# Patient Record
(demographics unavailable — no encounter records)

---

## 2025-03-25 NOTE — DISCUSSION/SUMMARY
[de-identified] : Diagnosis: Meniscus Tear   CAROLINA ALMANZAR 73 year  M was seen and evaluated in the office today. Following evaluation, and history of the patient's condition at length, the pathology was explained in full to the patient in layman's terms. Explained to the patient that based on physical exam and imaging review, they likely have Medial/lateral meniscal tear. Discussed with patient that due to the fragile blood supply of the meniscus and the nature of the condition, it is unlikely that the tear will heal on its own, and they will likely experience constant/episodic pain in the knee limiting ADLs. Discussed with patients that due to this meniscal tear, they are at increased risk of developing knee osteoarthritis and therefore ultimately requiring a total knee arthroplasty in their lifetime.   In the interim, discussed with patient several different treatment options regarding managing the gradual loss of function associated with meniscal tears and the progression to OA along with specific risks and benefits. Nonsurgical options including but not limited to Corticosteroid Injection, Meloxicam 15mg, Medrol Dose Pack, along with activity modification such as non-impact exercise and organized physical therapy. The risk of morbidity associated with proposed treatments were discussed.   Furthermore, discussed with CAROLINA that they could also delay any immediate treatment options and continue to observe and self-care for the discussed problem. Discussed Home Exercise Programs as well as Rest, Ice and elevation. Patient had ample time to ask any questions about todays visit and the diagnosis, and all questions were answered. Patient verbally expressed they understand the discussion today and the plan going forward. -- After discussion of options the patient expressed a desire to hold off on any of the proposed treatments at this time. As, discussed with patient, the plan is for the patient to observe and continue to self-manage, these including but not limited to HEP, Ice, NSAIDs PRN and rest. Patient was instructed to f/u as needed if symptoms persist/worsen. They expressed understanding of this plan.  -- Patient was advised to follow up in 2-3 months to further evaluate, or sooner if symptoms worsen.

## 2025-03-25 NOTE — HISTORY OF PRESENT ILLNESS
[de-identified] : Date of Injury/Onset: RIGHT KNEE Mechanism of injury: NKI Have you been treated for this in the past? NO Have you had surgery for this in the past? NO Physical Therapy/ HEP: None OTC Medicines: NO RX medicines:   Heat, Ice, Elevation: None CSI or Gel Injections:  None Other Previous Treatment: Prior Imaging/Studies:   HAS MRI  At Rest: 0/10 With Activity: 0/10 Quality of symptoms:   Affecting Sleep: Yes Stiffness upon waking, lasting greater than 30mins: Yes Difficulty with stairs: Yes Difficulty getting in and out of car: Yes Sit to stand stiffness: Yes Affects walking short/long distances? Yes Home/Work/Recreation affected? Yes   Improves with: Worse with:   This is not a Work-Related Injury being treated under Worker's Compensation. This is not an athletic injury occurring associated with an interscholastic or organized sports team.

## 2025-03-25 NOTE — DATA REVIEWED
[MRI] : MRI [Right] : of the right [Knee] : knee [I independently reviewed and interpreted images and report] : I independently reviewed and interpreted images and report [FreeTextEntry1] : Longitudinal vertical tear involving the body lateral meniscus. Mild fraying/truncation of the free edge body medial meniscus.

## 2025-03-25 NOTE — PHYSICAL EXAM
[5___] : hamstring 5[unfilled]/5 [] : non-antalgic [Right] : right knee [AP] : anteroposterior [Lateral] : lateral [[place]] : skyline [AP Standing] : anteroposterior standing [FreeTextEntry9] : Mild to moderate degenerative changes with overall maintained joint spaces.

## 2025-05-09 NOTE — CARDIOLOGY SUMMARY
[de-identified] : Atrial fibrillation with a ventricular rate of 64 bpm, a normal axis, a normal QRS duration 97 ms, a normal QT interval 379 ms with delayed R wave transition across the anterior precordium, and inverted T waves noted in leads III and aVF.

## 2025-05-09 NOTE — SURGICAL HISTORY
[TextEntry] : - angiogram right    - drainage and removal of lesion on back    - EYE SURGERY    - FLUORO ASST. ANGIOGRAM Right   leg - HAND SURGERY   1995 - right hand surgery    - TOENAIL EXCISION

## 2025-05-09 NOTE — HISTORY OF PRESENT ILLNESS
[FreeTextEntry1] : As you know Mr. Aung Martinez is a 73-year-old man with a past medical history of chronic atrial fibrillation, PAD, diabetes mellitus, hypertension, mitral valve regurgitation, and dyslipidemia who presents today for cardiovascular follow up. The patient has been feeling well. He is a bit fatigued, however. He is trying to better manage his diabetes. He has not been exercising due to pain in his back and knees. He denies any chest pain, shortness of breath, syncope, orthopnea, or PND. He has been taking his medications as prescribed. He continues on Eliquis and denies any epistaxis, hemoptysis, hematochezia, hematemesis, hematuria, or melena.

## 2025-05-09 NOTE — REVIEW OF SYSTEMS
[Fever] : no fever [Headache] : no headache [Weight Gain (___ Lbs)] : no recent weight gain [Chills] : no chills [Feeling Fatigued] : feeling fatigued [Weight Loss (___ Lbs)] : no recent weight loss [Blurry Vision] : no blurred vision [Seeing Double (Diplopia)] : no diplopia [Eye Pain] : no eye pain [Earache] : no earache [Discharge From Ears] : no discharge from the ears [Hearing Loss] : no hearing loss [Mouth Sores] : no mouth sores [Sore Throat] : no sore throat [Sinus Pressure] : no sinus pressure [Tinnitus] : no tinnitus [Vertigo] : no vertigo [SOB] : no shortness of breath [Dyspnea on exertion] : not dyspnea during exertion [Chest Discomfort] : no chest discomfort [Lower Ext Edema] : no extremity edema [Leg Claudication] : no intermittent leg claudication [Palpitations] : no palpitations [Orthopnea] : no orthopnea [PND] : no PND [Syncope] : no syncope [Cough] : no cough [Wheezing] : no wheezing [Coughing Up Blood] : no hemoptysis [Snoring] : no snoring [Abdominal Pain] : no abdominal pain [Nausea] : no nausea [Vomiting] : no vomiting [Heartburn] : no heartburn [Change in Appetite] : no change in appetite [Change In The Stool] : no change in stool [Dysphagia] : no dysphagia [Diarrhea] : diarrhea [Constipation] : no constipation [Blood in stool] : no blood in stoo [Urinary Frequency] : no change in urinary frequency [Hematuria] : no hematuria [Pain During Urination] : no dysuria [Erectile Dysfunction] : no erectile dysfunction [Nocturia] : no nocturia [Joint Pain] : joint pain [Joint Swelling] : no joint swelling [Joint Stiffness] : no joint stiffness [Muscle Cramps] : no muscle cramps [Myalgia] : no myalgia [Knee Pain] : knee pain [Lower Back Pain] : lower back pain [Mid Back Pain] : mid back pain [Upper Back Pain] : upper back pain [Rash] : no rash [Itching] : no itching [Change In Color Of Skin] : change in skin color [Skin Lesions] : no skin lesions [Telangiectasias] : no telangiectasias [Dizziness] : no dizziness [Tremor] : no tremor was seen [Numbness (Hypoesthesia)] : no numbness [Convulsions] : no convulsions [Tingling (Paresthesia)] : no tingling [Limb Weakness (Paresis)] : no limb weakness (Paresis) [Speech Disturbance] : no speech disturbance [Confusion] : no confusion was observed [Memory Lapses Or Loss] : no memory lapses or loss [Depression] : no depression [Anxiety] : no anxiety [Under Stress] : not under stress [Suicidal] : not suicidal [Easy Bleeding] : no tendency for easy bleeding [Swollen Glands] : no swollen glands [Easy Bruising] : no tendency for easy bruising

## 2025-05-09 NOTE — SOCIAL HISTORY
[TextEntry] : The patient is currently . He has nine living children. One child  at birth. He has 7 sons and 2 daughters, all of whom are well. He does not smoke nor has he in the past. He does not drink alcohol. He denies any drug use. He is a retired .

## 2025-05-09 NOTE — PAST MEDICAL HISTORY
[TextEntry] : - Abdominal fibromatosis  - Arrhythmia   a-fib - Atrial fibrillation (HCC) 2011 - Diabetes mellitus (HCC)  - Hearing aid worn  - Hearing loss  - Hyperlipidemia  - Hypertension  - Neuropathy  - PAD (peripheral artery disease) ([facility])  - Thyroid disease 2021  hypothyroid

## 2025-05-09 NOTE — PAST MEDICAL HISTORY
[TextEntry] : - Abdominal fibromatosis  - Arrhythmia   a-fib - Atrial fibrillation (HCC) 2011 - Diabetes mellitus (HCC)  - Hearing aid worn  - Hearing loss  - Hyperlipidemia  - Hypertension  - Neuropathy  - PAD (peripheral artery disease) ([facility])  - Thyroid disease 2021  hypothyroid

## 2025-05-09 NOTE — CARDIOLOGY SUMMARY
[de-identified] : Atrial fibrillation with a ventricular rate of 64 bpm, a normal axis, a normal QRS duration 97 ms, a normal QT interval 379 ms with delayed R wave transition across the anterior precordium, and inverted T waves noted in leads III and aVF.

## 2025-05-09 NOTE — DISCUSSION/SUMMARY
[___ Month(s)] : in [unfilled] month(s) [FreeTextEntry1] : The patient's heart rate and blood pressure are well controlled. I have asked him to continue with his current medications as prescribed without change.  I have asked the patient to undergo a Lexiscan Cardiolite stress test to evaluate for myocardial ischemia. he is unable to walk on the treadmill sufficiently due to osteoarthritis in his knees.  I have asked the patient to undergo an echocardiogram to assess LV size, wall thickness, systolic function, valvular function, and pulmonary artery systolic pressure.  I have asked the patient to undergo a carotid ultrasound to evaluate for carotid stenosis.  I have asked the patient to follow a low salt, low fat, low cholesterol diabetic diet. I have asked him to engage in regular exercise for a minimum of 30 minutes daily.  I have asked that the patient follow up with me in one months' time, or sooner with any change in symptoms.

## 2025-05-09 NOTE — ASSESSMENT
[FreeTextEntry1] : 1. Chronic atrial fibrillation 2. Diabetes mellitus 3. Dyslipidemia 4. Hypertension 5. PAD 6. Left carotid bruit. 7. Fatigue.

## 2025-05-09 NOTE — FAMILY HISTORY
[TextEntry] : The patient's mother  at age 78 from heart disease. His father  at age 80 from old age.